# Patient Record
(demographics unavailable — no encounter records)

---

## 2024-10-08 NOTE — HISTORY OF PRESENT ILLNESS
[de-identified] : Date of Injury/Onset: 09/29/2024 Pain: At Rest: 3 With Activity: 9 Mechanism of injury: This is NOT a Work Related Injury being treated under Worker's Compensation. This is NOT an athletic injury occurring associated with an interscholastic or organized sports team. Quality of symptoms: Improves with: Worse with: Prior treatment: crutches, surgical shoes, splint Prior Imaging: X-RAYS Reports Available For Review Today: no Out of work/sport: working School/Sport/Position/Occupation: health insurances   09/30/2024 CONNER she is here today for evaluation of left ankle. Patient reports injury on 09/29 she fell down the stairs while carrying laundry bag. Patient visited [facility] on the same day, was sent home with crutches, a splint and surgical shoes. Patient denies N/T however endorse some swelling, soreness and sharp pain. Patient notes pain is worse with any physical movement. Patient notes intermittent pain when she tried to move. Patient is ambulating with a wheelchair today.  10/08/2024 CONNER  is here today to follow up on MRI results of left ankle and pre op visit.

## 2024-10-08 NOTE — DATA REVIEWED
[FreeTextEntry1] : 3 v L ankle miah equivelent loose body medial gutter  MR L ankle  AITFL tear OLT cannot assess loose fragment location, questionable medial gutter

## 2024-10-08 NOTE — DISCUSSION/SUMMARY
[de-identified] : Patient presents today for evaluation of left ankle pain. Based on history, physical examination, imaging I discussed with her that her symptoms are related to his bimalleolar equivalent ankle fracture with possible loose body.  We discussed that due to the fact that he has an unstable ankle fracture surgical fixation is necessary to help anatomically reduce her ankle and mitigate her risk for progressive arthrosis well improving his chance at an optimal recovery. We discussed the risks and benefits of surgery which include but not limited to soft tissue/wound complications, bleeding, infection, neurologic injury, malunion nonunion, potential loss of life, potential need for reoperation potential need for subsequent procedures in the future.   We discussed that surgical fixation would be a combination of plates and screws to address all fractured pathology. We discussed surgical timing. Due to the fact that he is grossly swollen in his left lower extremity it is currently not safe to proceed with surgery at this time and would tentatively plan for next week.   Preoperative informative will be given to patient which includes preop instructions regarding weightbearing status and instructions on keeping splint clean and dry as well as how to navigate the weightbearing status. Plan to see patient back in 1 week to check on the swelling of his leg and foot. Should his swelling significantly improved plan to proceed with surgery.   I discussed with patient that it is not uncommon to require a week or 2 of soft tissue rest to allow for the skin to be amenable for surgery. They understand that we are cautiously optimistic to be able to perform her surgery next week but agree that we should only proceed when it is safe to do so. We went over the postoperative protocol which would include nonweightbearing for at minimum 4 weeks. crutch ambulating in a splint for 2 weeks followed by a boot. Sutures would plan to be removed somewhere between 2 to 4 weeks depending on how he is healing.   Surgical plan would be: Fibula ORIF, OCD refixation versus debridement, Deltoid repair, possible syndesmosis repair.  Rec: STAT MR L ankle to assess for size of loose OCD  Modifiable risk factors that he can control is increasing vitamin D as well as having a well-balanced diet, and abstaining from any nicotine or alcohol products.   She confirms that she does not use those anywhere. All questions asked and answered. Plan for patient to follow-up in 1 week for wound check.  next visit: Order preop meds, Tall boot **** 10/7 Pre-op Visit MR Reviewed Plan for ORIF Fibula, Deltoid repair, Possible refixation of OLT v debridment and microfx, possible syndesmosis repair D/w pt risks and benefits of surgery as outlined above Pt understands the risks and plans to proceed with surgery post operative medication sent  next visit: L ankle XR nwb suture removal cast application

## 2024-10-08 NOTE — IMAGING
[de-identified] : limited L ankle mildly swollen TTP medial malleolus , lateral malleolus ROM /Strength deferred secondary to pain able to wiggle toes VÍCTOR CARRION SP T S S n distribution

## 2024-10-22 NOTE — DATA REVIEWED
[FreeTextEntry1] : 3 v L ankle miah equivelent loose body medial gutter  MR L ankle  AITFL tear OLT cannot assess loose fragment location, questionable medial gutter  3 v L ankle 10/21 s/p ORIF of distal fibula and chondroplasty hardware in acceptable position

## 2024-10-22 NOTE — IMAGING
[de-identified] : limited L ankle mildly swollen TTP medial malleolus , lateral malleolus ROM /Strength deferred secondary to pain able to wiggle toes VÍCTOR CARRION SP T S S n distribution

## 2024-10-22 NOTE — PHYSICAL EXAM
[de-identified] : Limited L ankle Splint removed incisions clean and dry no evidence of discharge or drainage Medially I was able to remove staples 3cm incision, laterally, skin was not ready to remove staples appropriate tender along incision site ROM: 10-30 DF PF SILT DP SP T S S

## 2024-10-22 NOTE — DISCUSSION/SUMMARY
[de-identified] : Patient presents today for evaluation of left ankle pain. Based on history, physical examination, imaging I discussed with her that her symptoms are related to his bimalleolar equivalent ankle fracture with possible loose body.  We discussed that due to the fact that he has an unstable ankle fracture surgical fixation is necessary to help anatomically reduce her ankle and mitigate her risk for progressive arthrosis well improving his chance at an optimal recovery. We discussed the risks and benefits of surgery which include but not limited to soft tissue/wound complications, bleeding, infection, neurologic injury, malunion nonunion, potential loss of life, potential need for reoperation potential need for subsequent procedures in the future.   We discussed that surgical fixation would be a combination of plates and screws to address all fractured pathology. We discussed surgical timing. Due to the fact that he is grossly swollen in his left lower extremity it is currently not safe to proceed with surgery at this time and would tentatively plan for next week.   Preoperative informative will be given to patient which includes preop instructions regarding weightbearing status and instructions on keeping splint clean and dry as well as how to navigate the weightbearing status. Plan to see patient back in 1 week to check on the swelling of his leg and foot. Should his swelling significantly improved plan to proceed with surgery.   I discussed with patient that it is not uncommon to require a week or 2 of soft tissue rest to allow for the skin to be amenable for surgery. They understand that we are cautiously optimistic to be able to perform her surgery next week but agree that we should only proceed when it is safe to do so. We went over the postoperative protocol which would include nonweightbearing for at minimum 4 weeks. crutch ambulating in a splint for 2 weeks followed by a boot. Sutures would plan to be removed somewhere between 2 to 4 weeks depending on how he is healing.   Surgical plan would be: Fibula ORIF, OCD refixation versus debridement, Deltoid repair, possible syndesmosis repair.  Rec: STAT MR L ankle to assess for size of loose OCD  Modifiable risk factors that he can control is increasing vitamin D as well as having a well-balanced diet, and abstaining from any nicotine or alcohol products.   She confirms that she does not use those anywhere. All questions asked and answered. Plan for patient to follow-up in 1 week for wound check.  next visit: Order preop meds, Tall boot **** 10/7 Pre-op Visit MR Reviewed Plan for ORIF Fibula, Deltoid repair, Possible refixation of OLT v debridment and microfx, possible syndesmosis repair D/w pt risks and benefits of surgery as outlined above Pt understands the risks and plans to proceed with surgery post operative medication sent  next visit: L ankle XR nwb suture removal cast application **** 10/22 POV 1 pt is 12 days post op ORIF distal fibula, syndesmosis repair, deltoid repair, chondroplasty+microfx I was able to remove her staples medially but laterally her skin was not ready Sterisstrips applied Short leg cast applied Continue NWB Continue dvt ppx XR demonstrates fx in acceptable position  next visit: Cast removal , staple removal, cast application

## 2024-10-22 NOTE — HISTORY OF PRESENT ILLNESS
[de-identified] : Date of Injury/Onset: 09/29/2024 Pain: At Rest: 3 With Activity: 9 Mechanism of injury: This is NOT a Work Related Injury being treated under Worker's Compensation. This is NOT an athletic injury occurring associated with an interscholastic or organized sports team. Quality of symptoms: Improves with: Worse with: Prior treatment: crutches, surgical shoes, splint Prior Imaging: X-RAYS Reports Available For Review Today: no Out of work/sport: working School/Sport/Position/Occupation: health insurances   09/30/2024 CONNER she is here today for evaluation of left ankle. Patient reports injury on 09/29 she fell down the stairs while carrying laundry bag. Patient visited [facility] on the same day, was sent home with crutches, a splint and surgical shoes. Patient denies N/T however endorse some swelling, soreness and sharp pain. Patient notes pain is worse with any physical movement. Patient notes intermittent pain when she tried to move. Patient is ambulating with a wheelchair today.  10/08/2024 CONNER  is here today to follow up on MRI results of left ankle and pre op visit.  10/22/2024: Patient is here today for post op visit #1. Patient denies chest pain or fever after surgery. She had been taken blood Thiners. Pain level (5/10) . Pt states she fell early in post op period but denies any cracks or significant pain.

## 2024-10-29 NOTE — DISCUSSION/SUMMARY
[de-identified] : Patient presents today for evaluation of left ankle pain. Based on history, physical examination, imaging I discussed with her that her symptoms are related to his bimalleolar equivalent ankle fracture with possible loose body.  We discussed that due to the fact that he has an unstable ankle fracture surgical fixation is necessary to help anatomically reduce her ankle and mitigate her risk for progressive arthrosis well improving his chance at an optimal recovery. We discussed the risks and benefits of surgery which include but not limited to soft tissue/wound complications, bleeding, infection, neurologic injury, malunion nonunion, potential loss of life, potential need for reoperation potential need for subsequent procedures in the future.   We discussed that surgical fixation would be a combination of plates and screws to address all fractured pathology. We discussed surgical timing. Due to the fact that he is grossly swollen in his left lower extremity it is currently not safe to proceed with surgery at this time and would tentatively plan for next week.   Preoperative informative will be given to patient which includes preop instructions regarding weightbearing status and instructions on keeping splint clean and dry as well as how to navigate the weightbearing status. Plan to see patient back in 1 week to check on the swelling of his leg and foot. Should his swelling significantly improved plan to proceed with surgery.   I discussed with patient that it is not uncommon to require a week or 2 of soft tissue rest to allow for the skin to be amenable for surgery. They understand that we are cautiously optimistic to be able to perform her surgery next week but agree that we should only proceed when it is safe to do so. We went over the postoperative protocol which would include nonweightbearing for at minimum 4 weeks. crutch ambulating in a splint for 2 weeks followed by a boot. Sutures would plan to be removed somewhere between 2 to 4 weeks depending on how he is healing.   Surgical plan would be: Fibula ORIF, OCD refixation versus debridement, Deltoid repair, possible syndesmosis repair.  Rec: STAT MR L ankle to assess for size of loose OCD  Modifiable risk factors that he can control is increasing vitamin D as well as having a well-balanced diet, and abstaining from any nicotine or alcohol products.   She confirms that she does not use those anywhere. All questions asked and answered. Plan for patient to follow-up in 1 week for wound check.  next visit: Order preop meds, Tall boot **** 10/7 Pre-op Visit MR Reviewed Plan for ORIF Fibula, Deltoid repair, Possible refixation of OLT v debridment and microfx, possible syndesmosis repair D/w pt risks and benefits of surgery as outlined above Pt understands the risks and plans to proceed with surgery post operative medication sent  next visit: L ankle XR nwb suture removal cast application **** 10/22 POV 1 pt is 12 days post op ORIF distal fibula, syndesmosis repair, deltoid repair, chondroplasty+microfx I was able to remove her staples medially but laterally her skin was not ready Sterisstrips applied Short leg cast applied Continue NWB Continue dvt ppx XR demonstrates fx in acceptable position  next visit: Cast removal , staple removal, cast application *** 10/29 POV 1 pt is 19 days post op ORIF distal fibula, syndesmosis repair, deltoid repair, chondroplasty+microfx I was able to remove her staples medially and laterally Sterisstrips applied Short leg cast applied Continue NWB Continue dvt ppx XR demonstrates fx in acceptable position RTC 2 weeks transition into boot  week 6 start crutch ween and PT  next visit: Cast removal , staple removal, cast application

## 2024-10-29 NOTE — IMAGING
[de-identified] : limited L ankle mildly swollen TTP medial malleolus , lateral malleolus ROM /Strength deferred secondary to pain able to wiggle toes VÍCTOR CARRION SP T S S n distribution

## 2024-10-29 NOTE — PHYSICAL EXAM
[de-identified] : Limited L ankle cast removed incisions clean and dry no evidence of discharge or drainage 3 cm incision medially and 7 cm incision laterally,  appropriate tender along incision site ROM: 1530 DF PF SILT DP SP T S S

## 2024-10-29 NOTE — HISTORY OF PRESENT ILLNESS
[de-identified] : Date of Injury/Onset: 09/29/2024 Pain: At Rest: 3 With Activity: 9 Mechanism of injury: This is NOT a Work Related Injury being treated under Worker's Compensation. This is NOT an athletic injury occurring associated with an interscholastic or organized sports team. Quality of symptoms: Improves with: Worse with: Prior treatment: crutches, surgical shoes, splint Prior Imaging: X-RAYS Reports Available For Review Today: no Out of work/sport: working School/Sport/Position/Occupation: health insurances   09/30/2024 CONNER she is here today for evaluation of left ankle. Patient reports injury on 09/29 she fell down the stairs while carrying laundry bag. Patient visited [facility] on the same day, was sent home with crutches, a splint and surgical shoes. Patient denies N/T however endorse some swelling, soreness and sharp pain. Patient notes pain is worse with any physical movement. Patient notes intermittent pain when she tried to move. Patient is ambulating with a wheelchair today.  10/08/2024 CONNER  is here today to follow up on MRI results of left ankle and pre op visit.  10/22/2024: Patient is here today for post op visit #1. Patient denies chest pain or fever after surgery. She had been taken blood Thiners. Pain level (5/10) . Pt states she fell early in post op period but denies any cracks or significant pain.  10/29/24: Here for post op visit #2. Denies chest pain or sob. Denies falls. Compliant with blood thinners.

## 2024-11-13 NOTE — DISCUSSION/SUMMARY
[de-identified] : Patient presents today for evaluation of left ankle pain. Based on history, physical examination, imaging I discussed with her that her symptoms are related to his bimalleolar equivalent ankle fracture with possible loose body.  We discussed that due to the fact that he has an unstable ankle fracture surgical fixation is necessary to help anatomically reduce her ankle and mitigate her risk for progressive arthrosis well improving his chance at an optimal recovery. We discussed the risks and benefits of surgery which include but not limited to soft tissue/wound complications, bleeding, infection, neurologic injury, malunion nonunion, potential loss of life, potential need for reoperation potential need for subsequent procedures in the future.   We discussed that surgical fixation would be a combination of plates and screws to address all fractured pathology. We discussed surgical timing. Due to the fact that he is grossly swollen in his left lower extremity it is currently not safe to proceed with surgery at this time and would tentatively plan for next week.   Preoperative informative will be given to patient which includes preop instructions regarding weightbearing status and instructions on keeping splint clean and dry as well as how to navigate the weightbearing status. Plan to see patient back in 1 week to check on the swelling of his leg and foot. Should his swelling significantly improved plan to proceed with surgery.   I discussed with patient that it is not uncommon to require a week or 2 of soft tissue rest to allow for the skin to be amenable for surgery. They understand that we are cautiously optimistic to be able to perform her surgery next week but agree that we should only proceed when it is safe to do so. We went over the postoperative protocol which would include nonweightbearing for at minimum 4 weeks. crutch ambulating in a splint for 2 weeks followed by a boot. Sutures would plan to be removed somewhere between 2 to 4 weeks depending on how he is healing.   Surgical plan would be: Fibula ORIF, OCD refixation versus debridement, Deltoid repair, possible syndesmosis repair.  Rec: STAT MR L ankle to assess for size of loose OCD  Modifiable risk factors that he can control is increasing vitamin D as well as having a well-balanced diet, and abstaining from any nicotine or alcohol products.   She confirms that she does not use those anywhere. All questions asked and answered. Plan for patient to follow-up in 1 week for wound check.  next visit: Order preop meds, Tall boot **** 10/7 Pre-op Visit MR Reviewed Plan for ORIF Fibula, Deltoid repair, Possible refixation of OLT v debridment and microfx, possible syndesmosis repair D/w pt risks and benefits of surgery as outlined above Pt understands the risks and plans to proceed with surgery post operative medication sent  next visit: L ankle XR nwb suture removal cast application **** 10/22 POV 1 pt is 12 days post op ORIF distal fibula, syndesmosis repair, deltoid repair, chondroplasty+microfx I was able to remove her staples medially but laterally her skin was not ready Sterisstrips applied Short leg cast applied Continue NWB Continue dvt ppx XR demonstrates fx in acceptable position  next visit: Cast removal , staple removal, cast application *** 10/29 POV 2 pt is 19 days post op ORIF distal fibula, syndesmosis repair, deltoid repair, chondroplasty+microfx I was able to remove her staples medially and laterally Sterisstrips applied Short leg cast applied Continue NWB Continue dvt ppx XR demonstrates fx in acceptable position RTC 2 weeks transition into boot  week 6 start crutch ween and PT  next visit: Cast removal , transition into boot , gentle ROM **** 11/13 POV 3 pt is 4 weeks post op ORIF distal fibula, syndesmosis repair, deltoid repair, chondroplasty+microfx cast removed Sterisstrips applied [place]  applied Continue NWB, work on gentle ROM Continue dvt ppx RTC 2 weeks  next visit: transition into crutch ween and start PT L ankle XR NWB

## 2024-11-13 NOTE — PHYSICAL EXAM
[de-identified] : Limited L ankle cast removed incisions clean and dry no evidence of discharge or drainage 3 cm incision medially and 7 cm incision laterally,  appropriate tender along incision site ROM: 0-30 DF PF SILT DP SP T S S able to wiggle toes

## 2024-11-13 NOTE — HISTORY OF PRESENT ILLNESS
[de-identified] : Date of Injury/Onset: 09/29/2024 Pain: At Rest: 3 With Activity: 9 Mechanism of injury: This is NOT a Work Related Injury being treated under Worker's Compensation. This is NOT an athletic injury occurring associated with an interscholastic or organized sports team. Quality of symptoms: Improves with: Worse with: Prior treatment: crutches, surgical shoes, splint Prior Imaging: X-RAYS Reports Available For Review Today: no Out of work/sport: working School/Sport/Position/Occupation: health insurances   09/30/2024 CONNER she is here today for evaluation of left ankle. Patient reports injury on 09/29 she fell down the stairs while carrying laundry bag. Patient visited [facility] on the same day, was sent home with crutches, a splint and surgical shoes. Patient denies N/T however endorse some swelling, soreness and sharp pain. Patient notes pain is worse with any physical movement. Patient notes intermittent pain when she tried to move. Patient is ambulating with a wheelchair today.  10/08/2024 CONNER  is here today to follow up on MRI results of left ankle and pre op visit.  10/22/2024: Patient is here today for post op visit #1. Patient denies chest pain or fever after surgery. She had been taken blood Thiners. Pain level (5/10) . Pt states she fell early in post op period but denies any cracks or significant pain.   10/29/24: Here for post op visit #2. Denies chest pain or sob. Denies falls. Compliant with blood thinners.   11/13/2024 :CONNER SEVILLA , a 40 year old female, presents today for Post op visit #3  left ankle, patient states she is doing better continues use of crutches and is in cast cast will be transitioning to camboot today . Denies fevers/ chills nights. Denies CP or SOB.

## 2024-11-26 NOTE — PHYSICAL EXAM
[de-identified] : Limited L ankle incisions clean and dry no evidence of discharge or drainage 3 cm incision medially and 7 cm incision laterally,  appropriate tender along incision site, but improving ROM: 0-30 DF PF SILT DP SP T S S able to wiggle toes

## 2024-11-26 NOTE — DATA REVIEWED
[FreeTextEntry1] : 3 v L ankle miah equivelent loose body medial gutter  MR L ankle  AITFL tear OLT cannot assess loose fragment location, questionable medial gutter  3 v L ankle 10/21 s/p ORIF of distal fibula and chondroplasty hardware in acceptable position  3 v L ankle 11/25 s/p ORIF of distal fibula and chondroplasty hardware in acceptable position

## 2024-11-26 NOTE — DISCUSSION/SUMMARY
[de-identified] : Patient presents today for evaluation of left ankle pain. Based on history, physical examination, imaging I discussed with her that her symptoms are related to his bimalleolar equivalent ankle fracture with possible loose body.  We discussed that due to the fact that he has an unstable ankle fracture surgical fixation is necessary to help anatomically reduce her ankle and mitigate her risk for progressive arthrosis well improving his chance at an optimal recovery. We discussed the risks and benefits of surgery which include but not limited to soft tissue/wound complications, bleeding, infection, neurologic injury, malunion nonunion, potential loss of life, potential need for reoperation potential need for subsequent procedures in the future.   We discussed that surgical fixation would be a combination of plates and screws to address all fractured pathology. We discussed surgical timing. Due to the fact that he is grossly swollen in his left lower extremity it is currently not safe to proceed with surgery at this time and would tentatively plan for next week.   Preoperative informative will be given to patient which includes preop instructions regarding weightbearing status and instructions on keeping splint clean and dry as well as how to navigate the weightbearing status. Plan to see patient back in 1 week to check on the swelling of his leg and foot. Should his swelling significantly improved plan to proceed with surgery.   I discussed with patient that it is not uncommon to require a week or 2 of soft tissue rest to allow for the skin to be amenable for surgery. They understand that we are cautiously optimistic to be able to perform her surgery next week but agree that we should only proceed when it is safe to do so. We went over the postoperative protocol which would include nonweightbearing for at minimum 4 weeks. crutch ambulating in a splint for 2 weeks followed by a boot. Sutures would plan to be removed somewhere between 2 to 4 weeks depending on how he is healing.   Surgical plan would be: Fibula ORIF, OCD refixation versus debridement, Deltoid repair, possible syndesmosis repair.  Rec: STAT MR L ankle to assess for size of loose OCD  Modifiable risk factors that he can control is increasing vitamin D as well as having a well-balanced diet, and abstaining from any nicotine or alcohol products.   She confirms that she does not use those anywhere. All questions asked and answered. Plan for patient to follow-up in 1 week for wound check.  next visit: Order preop meds, Tall boot **** 10/7 Pre-op Visit MR Reviewed Plan for ORIF Fibula, Deltoid repair, Possible refixation of OLT v debridment and microfx, possible syndesmosis repair D/w pt risks and benefits of surgery as outlined above Pt understands the risks and plans to proceed with surgery post operative medication sent  next visit: L ankle XR nwb suture removal cast application **** 10/22 POV 1 pt is 12 days post op ORIF distal fibula, syndesmosis repair, deltoid repair, chondroplasty+microfx I was able to remove her staples medially but laterally her skin was not ready Sterisstrips applied Short leg cast applied Continue NWB Continue dvt ppx XR demonstrates fx in acceptable position  next visit: Cast removal , staple removal, cast application *** 10/29 POV 2 pt is 19 days post op ORIF distal fibula, syndesmosis repair, deltoid repair, chondroplasty+microfx I was able to remove her staples medially and laterally Sterisstrips applied Short leg cast applied Continue NWB Continue dvt ppx XR demonstrates fx in acceptable position RTC 2 weeks transition into boot  week 6 start crutch ween and PT  next visit: Cast removal , transition into boot , gentle ROM **** 11/13 POV 3 pt is 4 weeks post op ORIF distal fibula, syndesmosis repair, deltoid repair, chondroplasty+microfx cast removed Sterisstrips applied Camboot  applied Continue NWB, work on gentle ROM Continue dvt ppx RTC 2 weeks  next visit: transition into crutch ween and start PT L ankle XR NWB *** 11/25 POV 4 pt is 6 weeks post op ORIF distal fibula, syndesmosis repair, deltoid repair, chondroplasty+microfx camboot removed Sterisstrips applied Camboot   crutch ween, work on gentle ROM PT rx given with protocol Continue dvt ppx RTC 2 weeks  next visit: boot ween and aso RTC 4 weeks

## 2024-11-26 NOTE — HISTORY OF PRESENT ILLNESS
[de-identified] : Date of Injury/Onset: 09/29/2024 Pain: At Rest: 3 With Activity: 9 Mechanism of injury: This is NOT a Work Related Injury being treated under Worker's Compensation. This is NOT an athletic injury occurring associated with an interscholastic or organized sports team. Quality of symptoms: Improves with: Worse with: Prior treatment: crutches, surgical shoes, splint Prior Imaging: X-RAYS Reports Available For Review Today: no Out of work/sport: working School/Sport/Position/Occupation: health insurances   09/30/2024 CONNER she is here today for evaluation of left ankle. Patient reports injury on 09/29 she fell down the stairs while carrying laundry bag. Patient visited [facility] on the same day, was sent home with crutches, a splint and surgical shoes. Patient denies N/T however endorse some swelling, soreness and sharp pain. Patient notes pain is worse with any physical movement. Patient notes intermittent pain when she tried to move. Patient is ambulating with a wheelchair today.  10/08/2024 CONNER  is here today to follow up on MRI results of left ankle and pre op visit.  10/22/2024: Patient is here today for post op visit #1. Patient denies chest pain or fever after surgery. She had been taken blood Thiners. Pain level (5/10) . Pt states she fell early in post op period but denies any cracks or significant pain.   10/29/24: Here for post op visit #2. Denies chest pain or sob. Denies falls. Compliant with blood thinners.   11/13/2024 :CONNER SEVILLA , a 40 year old female, presents today for Post op visit #3  left ankle, patient states she is doing better continues use of crutches and is in cast cast will be transitioning to camboot today . Denies fevers/ chills nights. Denies CP or SOB.   11/26/24; Patient is here for PO visit #4 states her left ankle is feeling much better. still using crutches and Camboot.   Reports pain well controlled.

## 2024-12-10 NOTE — HISTORY OF PRESENT ILLNESS
[de-identified] : Date of Injury/Onset: 09/29/2024 Pain: At Rest: 3 With Activity: 9 Mechanism of injury: This is NOT a Work Related Injury being treated under Worker's Compensation. This is NOT an athletic injury occurring associated with an interscholastic or organized sports team. Quality of symptoms: Improves with: Worse with: Prior treatment: crutches, surgical shoes, splint Prior Imaging: X-RAYS Reports Available For Review Today: no Out of work/sport: working School/Sport/Position/Occupation: health insurances   09/30/2024 CONNER she is here today for evaluation of left ankle. Patient reports injury on 09/29 she fell down the stairs while carrying laundry bag. Patient visited [facility] on the same day, was sent home with crutches, a splint and surgical shoes. Patient denies N/T however endorse some swelling, soreness and sharp pain. Patient notes pain is worse with any physical movement. Patient notes intermittent pain when she tried to move. Patient is ambulating with a wheelchair today.  10/08/2024 CONNER  is here today to follow up on MRI results of left ankle and pre op visit.  10/22/2024: Patient is here today for post op visit #1. Patient denies chest pain or fever after surgery. She had been taken blood Thiners. Pain level (5/10) . Pt states she fell early in post op period but denies any cracks or significant pain.   10/29/24: Here for post op visit #2. Denies chest pain or sob. Denies falls. Compliant with blood thinners.   11/13/2024 :CONNER SEVILLA , a 40 year old female, presents today for Post op visit #3  left ankle, patient states she is doing better continues use of crutches and is in cast cast will be transitioning to camboot today . Denies fevers/ chills nights. Denies CP or SOB.   11/26/24; Patient is here for PO visit #4 states her left ankle is feeling much better. still using crutches and Camboot.   Reports pain well controlled.   12/10/24 Patient is here for PO visit #5 states her left ankle is feeling better. still using crutches and camboot.

## 2024-12-10 NOTE — DISCUSSION/SUMMARY
[de-identified] : Patient presents today for evaluation of left ankle pain. Based on history, physical examination, imaging I discussed with her that her symptoms are related to his bimalleolar equivalent ankle fracture with possible loose body.  We discussed that due to the fact that he has an unstable ankle fracture surgical fixation is necessary to help anatomically reduce her ankle and mitigate her risk for progressive arthrosis well improving his chance at an optimal recovery. We discussed the risks and benefits of surgery which include but not limited to soft tissue/wound complications, bleeding, infection, neurologic injury, malunion nonunion, potential loss of life, potential need for reoperation potential need for subsequent procedures in the future.   We discussed that surgical fixation would be a combination of plates and screws to address all fractured pathology. We discussed surgical timing. Due to the fact that he is grossly swollen in his left lower extremity it is currently not safe to proceed with surgery at this time and would tentatively plan for next week.   Preoperative informative will be given to patient which includes preop instructions regarding weightbearing status and instructions on keeping splint clean and dry as well as how to navigate the weightbearing status. Plan to see patient back in 1 week to check on the swelling of his leg and foot. Should his swelling significantly improved plan to proceed with surgery.   I discussed with patient that it is not uncommon to require a week or 2 of soft tissue rest to allow for the skin to be amenable for surgery. They understand that we are cautiously optimistic to be able to perform her surgery next week but agree that we should only proceed when it is safe to do so. We went over the postoperative protocol which would include nonweightbearing for at minimum 4 weeks. crutch ambulating in a splint for 2 weeks followed by a boot. Sutures would plan to be removed somewhere between 2 to 4 weeks depending on how he is healing.   Surgical plan would be: Fibula ORIF, OCD refixation versus debridement, Deltoid repair, possible syndesmosis repair.  Rec: STAT MR L ankle to assess for size of loose OCD  Modifiable risk factors that he can control is increasing vitamin D as well as having a well-balanced diet, and abstaining from any nicotine or alcohol products.   She confirms that she does not use those anywhere. All questions asked and answered. Plan for patient to follow-up in 1 week for wound check.  next visit: Order preop meds, Tall boot **** 10/7 Pre-op Visit MR Reviewed Plan for ORIF Fibula, Deltoid repair, Possible refixation of OLT v debridment and microfx, possible syndesmosis repair D/w pt risks and benefits of surgery as outlined above Pt understands the risks and plans to proceed with surgery post operative medication sent  next visit: L ankle XR nwb suture removal cast application **** 10/22 POV 1 pt is 12 days post op ORIF distal fibula, syndesmosis repair, deltoid repair, chondroplasty+microfx I was able to remove her staples medially but laterally her skin was not ready Sterisstrips applied Short leg cast applied Continue NWB Continue dvt ppx XR demonstrates fx in acceptable position  next visit: Cast removal , staple removal, cast application *** 10/29 POV 2 pt is 19 days post op ORIF distal fibula, syndesmosis repair, deltoid repair, chondroplasty+microfx I was able to remove her staples medially and laterally Sterisstrips applied Short leg cast applied Continue NWB Continue dvt ppx XR demonstrates fx in acceptable position RTC 2 weeks transition into boot  week 6 start crutch ween and PT  next visit: Cast removal , transition into boot , gentle ROM **** 11/13 POV 3 pt is 4 weeks post op ORIF distal fibula, syndesmosis repair, deltoid repair, chondroplasty+microfx cast removed Sterisstrips applied Camboot  applied Continue NWB, work on gentle ROM Continue dvt ppx RTC 2 weeks  next visit: transition into crutch ween and start PT L ankle XR NWB *** 11/25 POV 4 pt is 6 weeks post op ORIF distal fibula, syndesmosis repair, deltoid repair, chondroplasty+microfx camboot removed Sterisstrips applied Camboot   crutch ween, work on gentle ROM PT rx given with protocol Continue dvt ppx RTC 2 weeks  next visit: boot weabdon and aso RTC 4 weeks ***** POV 5 pt is8 weeks post op ORIF distal fibula, syndesmosis repair, deltoid repair, chondroplasty+microfx camboot removed Sterisstrips applied Camboot   crutch ween followed by boot ween, ASO info given , work on gentle ROM PT rx given with protocol discontinue dvt ppx RTC 3 weeks  next visit: PT progression, assess strength and ROM left ankle xr standing

## 2024-12-10 NOTE — PHYSICAL EXAM
[de-identified] : Limited L ankle incisions clean and dry no evidence of discharge or drainage, pin hole minimal bleeding, no discharge or drainage 3 cm incision medially and 7 cm incision laterally,  appropriate tender along incision site, but improving ROM: 0-30 DF PF SILT DP SP T S S able to wiggle toes

## 2025-01-07 NOTE — DATA REVIEWED
[FreeTextEntry1] : 3 v L ankle miah equivelent loose body medial gutter  MR L ankle  AITFL tear OLT cannot assess loose fragment location, questionable medial gutter  3 v L ankle 10/21 s/p ORIF of distal fibula and chondroplasty hardware in acceptable position  3 v L ankle 11/25 s/p ORIF of distal fibula and chondroplasty hardware in acceptable position  3 v L ankle 1/7 s/p ORIF of distal fibula and chondroplasty, fracture healed hardware in acceptable position

## 2025-01-07 NOTE — PHYSICAL EXAM
[de-identified] : Limited L ankle incisions well healed 3 cm incision medially and 7 cm incision laterally,  nontender along incision site ROM: 0-30 DF PF, 0-10 In/ev Motor: 5/5 DF/PF/Inv/Ev SILT DP SP T S S able to wiggle toes

## 2025-01-07 NOTE — HISTORY OF PRESENT ILLNESS
[de-identified] : Date of Injury/Onset: 09/29/2024 Pain: At Rest: 3 With Activity: 9 Mechanism of injury: This is NOT a Work Related Injury being treated under Worker's Compensation. This is NOT an athletic injury occurring associated with an interscholastic or organized sports team. Quality of symptoms: Improves with: Worse with: Prior treatment: crutches, surgical shoes, splint Prior Imaging: X-RAYS Reports Available For Review Today: no Out of work/sport: working School/Sport/Position/Occupation: health insurances   09/30/2024 CONNER she is here today for evaluation of left ankle. Patient reports injury on 09/29 she fell down the stairs while carrying laundry bag. Patient visited [facility] on the same day, was sent home with crutches, a splint and surgical shoes. Patient denies N/T however endorse some swelling, soreness and sharp pain. Patient notes pain is worse with any physical movement. Patient notes intermittent pain when she tried to move. Patient is ambulating with a wheelchair today.  10/08/2024 CONNER  is here today to follow up on MRI results of left ankle and pre op visit.  10/22/2024: Patient is here today for post op visit #1. Patient denies chest pain or fever after surgery. She had been taken blood Thiners. Pain level (5/10) . Pt states she fell early in post op period but denies any cracks or significant pain.   10/29/24: Here for post op visit #2. Denies chest pain or sob. Denies falls. Compliant with blood thinners.   11/13/2024 :CONNER SEVILLA , a 40 year old female, presents today for Post op visit #3  left ankle, patient states she is doing better continues use of crutches and is in cast cast will be transitioning to camboot today . Denies fevers/ chills nights. Denies CP or SOB.   11/26/24; Patient is here for PO visit #4 states her left ankle is feeling much better. still using crutches and Camboot.   Reports pain well controlled.   12/10/24 Patient is here for PO visit #5 states her left ankle is feeling better. still using crutches and camboot.   1/7/24 Patieint is here for PO visit #6,  states her left ankle, reports doing much better. still using ankle brace.   No longer using crutches or camboot. Compliant with PT.

## 2025-01-07 NOTE — DISCUSSION/SUMMARY
[de-identified] : Patient presents today for evaluation of left ankle pain. Based on history, physical examination, imaging I discussed with her that her symptoms are related to his bimalleolar equivalent ankle fracture with possible loose body.  We discussed that due to the fact that he has an unstable ankle fracture surgical fixation is necessary to help anatomically reduce her ankle and mitigate her risk for progressive arthrosis well improving his chance at an optimal recovery. We discussed the risks and benefits of surgery which include but not limited to soft tissue/wound complications, bleeding, infection, neurologic injury, malunion nonunion, potential loss of life, potential need for reoperation potential need for subsequent procedures in the future.   We discussed that surgical fixation would be a combination of plates and screws to address all fractured pathology. We discussed surgical timing. Due to the fact that he is grossly swollen in his left lower extremity it is currently not safe to proceed with surgery at this time and would tentatively plan for next week.   Preoperative informative will be given to patient which includes preop instructions regarding weightbearing status and instructions on keeping splint clean and dry as well as how to navigate the weightbearing status. Plan to see patient back in 1 week to check on the swelling of his leg and foot. Should his swelling significantly improved plan to proceed with surgery.   I discussed with patient that it is not uncommon to require a week or 2 of soft tissue rest to allow for the skin to be amenable for surgery. They understand that we are cautiously optimistic to be able to perform her surgery next week but agree that we should only proceed when it is safe to do so. We went over the postoperative protocol which would include nonweightbearing for at minimum 4 weeks. crutch ambulating in a splint for 2 weeks followed by a boot. Sutures would plan to be removed somewhere between 2 to 4 weeks depending on how he is healing.   Surgical plan would be: Fibula ORIF, OCD refixation versus debridement, Deltoid repair, possible syndesmosis repair.  Rec: STAT MR L ankle to assess for size of loose OCD  Modifiable risk factors that he can control is increasing vitamin D as well as having a well-balanced diet, and abstaining from any nicotine or alcohol products.   She confirms that she does not use those anywhere. All questions asked and answered. Plan for patient to follow-up in 1 week for wound check.  next visit: Order preop meds, Tall boot **** 10/7 Pre-op Visit MR Reviewed Plan for ORIF Fibula, Deltoid repair, Possible refixation of OLT v debridment and microfx, possible syndesmosis repair D/w pt risks and benefits of surgery as outlined above Pt understands the risks and plans to proceed with surgery post operative medication sent  next visit: L ankle XR nwb suture removal cast application **** 10/22 POV 1 pt is 12 days post op ORIF distal fibula, syndesmosis repair, deltoid repair, chondroplasty+microfx I was able to remove her staples medially but laterally her skin was not ready Sterisstrips applied Short leg cast applied Continue NWB Continue dvt ppx XR demonstrates fx in acceptable position  next visit: Cast removal , staple removal, cast application *** 10/29 POV 2 pt is 19 days post op ORIF distal fibula, syndesmosis repair, deltoid repair, chondroplasty+microfx I was able to remove her staples medially and laterally Sterisstrips applied Short leg cast applied Continue NWB Continue dvt ppx XR demonstrates fx in acceptable position RTC 2 weeks transition into boot  week 6 start crutch ween and PT  next visit: Cast removal , transition into boot , gentle ROM **** 11/13 POV 3 pt is 4 weeks post op ORIF distal fibula, syndesmosis repair, deltoid repair, chondroplasty+microfx cast removed Sterisstrips applied Camboot  applied Continue NWB, work on gentle ROM Continue dvt ppx RTC 2 weeks  next visit: transition into crutch ween and start PT L ankle XR NWB *** 11/25 POV 4 pt is 6 weeks post op ORIF distal fibula, syndesmosis repair, deltoid repair, chondroplasty+microfx camboot removed Sterisstrips applied Camboot   crutch ween, work on gentle ROM PT rx given with protocol Continue dvt ppx RTC 2 weeks  next visit: boot weabdon and aso RTC 4 weeks ***** POV 5 pt is8 weeks post op ORIF distal fibula, syndesmosis repair, deltoid repair, chondroplasty+microfx camboot removed Sterisstrips applied Camboot   crutch ween followed by boot ween, ASO info given , work on gentle ROM PT rx given with protocol discontinue dvt ppx RTC 3 weeks  next visit: PT progression, assess strength and ROM left ankle xr standing *** POV 6 pt is 12 weeks post op ORIF distal fibula, syndesmosis repair, deltoid repair, chondroplasty+microfx Incisions well healed ASO PT rx given with protocol RTC 4weeks hand out for PF night splint to work on ankle ROM  next visit: PT progression start treadmill 4.5months